# Patient Record
Sex: FEMALE | Race: WHITE | NOT HISPANIC OR LATINO | Employment: OTHER | URBAN - METROPOLITAN AREA
[De-identification: names, ages, dates, MRNs, and addresses within clinical notes are randomized per-mention and may not be internally consistent; named-entity substitution may affect disease eponyms.]

---

## 2023-01-01 ENCOUNTER — PATIENT OUTREACH (OUTPATIENT)
Dept: GASTROENTEROLOGY | Facility: CLINIC | Age: 88
End: 2023-01-01
Payer: COMMERCIAL

## 2023-01-01 ENCOUNTER — TRANSCRIBE ORDERS (OUTPATIENT)
Dept: OTHER | Age: 88
End: 2023-01-01

## 2023-01-01 ENCOUNTER — DOCUMENTATION ONLY (OUTPATIENT)
Dept: GASTROENTEROLOGY | Facility: CLINIC | Age: 88
End: 2023-01-01
Payer: COMMERCIAL

## 2023-01-01 ENCOUNTER — HEALTH MAINTENANCE LETTER (OUTPATIENT)
Age: 88
End: 2023-01-01

## 2023-01-01 ENCOUNTER — DOCUMENTATION ONLY (OUTPATIENT)
Dept: GASTROENTEROLOGY | Facility: CLINIC | Age: 88
End: 2023-01-01

## 2023-01-01 ENCOUNTER — TELEPHONE (OUTPATIENT)
Dept: GASTROENTEROLOGY | Facility: CLINIC | Age: 88
End: 2023-01-01
Payer: COMMERCIAL

## 2023-01-01 ENCOUNTER — OFFICE VISIT (OUTPATIENT)
Dept: GASTROENTEROLOGY | Facility: CLINIC | Age: 88
End: 2023-01-01
Payer: COMMERCIAL

## 2023-01-01 VITALS
BODY MASS INDEX: 22.52 KG/M2 | WEIGHT: 127.1 LBS | HEART RATE: 80 BPM | HEIGHT: 63 IN | DIASTOLIC BLOOD PRESSURE: 76 MMHG | SYSTOLIC BLOOD PRESSURE: 133 MMHG | OXYGEN SATURATION: 94 %

## 2023-01-01 DIAGNOSIS — Q45.3 PANCREAS DIVISUM: Primary | ICD-10-CM

## 2023-01-01 DIAGNOSIS — K85.00 IDIOPATHIC ACUTE PANCREATITIS WITHOUT INFECTION OR NECROSIS: Primary | ICD-10-CM

## 2023-01-01 PROCEDURE — 99205 OFFICE O/P NEW HI 60 MIN: CPT | Mod: GC | Performed by: INTERNAL MEDICINE

## 2023-01-01 RX ORDER — GLIPIZIDE 5 MG/1
TABLET ORAL
COMMUNITY
Start: 2023-01-01

## 2023-01-01 RX ORDER — ALBUTEROL SULFATE 0.83 MG/ML
2.5 SOLUTION RESPIRATORY (INHALATION)
COMMUNITY
Start: 2022-01-01

## 2023-01-01 RX ORDER — FLUTICASONE PROPIONATE AND SALMETEROL XINAFOATE 115; 21 UG/1; UG/1
AEROSOL, METERED RESPIRATORY (INHALATION)
COMMUNITY
Start: 2023-01-01

## 2023-01-01 RX ORDER — METOPROLOL SUCCINATE 25 MG/1
1 TABLET, EXTENDED RELEASE ORAL EVERY MORNING
COMMUNITY
Start: 2023-01-01

## 2023-01-01 RX ORDER — FUROSEMIDE 20 MG
1 TABLET ORAL EVERY MORNING
COMMUNITY
Start: 2023-01-01

## 2023-01-01 RX ORDER — SIMVASTATIN 10 MG
10 TABLET ORAL DAILY
COMMUNITY
Start: 2023-01-01

## 2023-01-01 RX ORDER — FLUTICASONE FUROATE AND VILANTEROL 200; 25 UG/1; UG/1
1 POWDER RESPIRATORY (INHALATION) DAILY
COMMUNITY
Start: 2022-01-01

## 2023-01-01 RX ORDER — AMLODIPINE BESYLATE 2.5 MG/1
2.5 TABLET ORAL
COMMUNITY
Start: 2023-01-01

## 2023-01-01 RX ORDER — CALCIUM CARBONATE/VITAMIN D3 600 MG-10
1 TABLET ORAL EVERY MORNING
COMMUNITY

## 2023-01-01 RX ORDER — PANTOPRAZOLE SODIUM 40 MG/1
1 TABLET, DELAYED RELEASE ORAL
COMMUNITY
Start: 2023-01-01 | End: 2023-08-19

## 2023-01-01 RX ORDER — NYSTATIN 100000 [USP'U]/G
POWDER TOPICAL EVERY 12 HOURS
COMMUNITY
Start: 2023-01-01 | End: 2024-03-28

## 2023-01-01 ASSESSMENT — PAIN SCALES - GENERAL: PAINLEVEL: NO PAIN (0)

## 2023-01-19 NOTE — TELEPHONE ENCOUNTER
Called patient to disucss referral from Dr Haney    Per patient, she does not want to follow up on referral at this time, pt doesn't have family who can help with transportation, [place] and doesn't want prolonged follow up at her age.    Welcomed pt to call us back at any time if she changes her mind.    ML

## 2023-01-19 NOTE — TELEPHONE ENCOUNTER
Pt referred by Dr Haney to Dr. Dao r/t pancreatic divisum, Pt declines further work up at [facility]    ML

## 2023-04-10 NOTE — TELEPHONE ENCOUNTER
Returned call, previously referred for SHARP study. Pt previously hesitant regarding transportation.     Discussed overall plan for SHARP, clinic, outpatient procedure, several visits, etc. Pt will come for in person visit with Dr. Dao to discuss on 4/26, study team notified.    ML

## 2023-04-10 NOTE — TELEPHONE ENCOUNTER
Health Call Center    Phone Message    May a detailed message be left on voicemail: yes     Reason for Call: Other: Pt's daughter, Lucy, is calling in asking for a call back. She states that the Pt had previously spoken with Gaby and did not want to follow up from a referral to Dr. Dao but was told to call back if she changed her mind. They would like to discuss care options now, as the Pt has now changed her mind. Please call back as soon as possible to discuss.     Action Taken: Message routed to:  [facility] Lyndsay    Travel Screening: Not Applicable

## 2023-04-11 NOTE — PROGRESS NOTES
Called [facility] to request images be pushed to [employer] PACS.    Patient will see Dr. Jermaine Dao in clinic on 4/26.    Images Requested:  MRI PANCREAS MRCP WITHOUT AND WITH CONTRAST (01/11/2023 1:50 PM CST)     CT ABD AND PELVIS WITHOUT IV CONTRAST (11/22/2022 3:59 PM CST)    CT ABD AND PELVIS WITHOUT IV CONTRAST (09/27/2021 3:43 PM CDT)    CT ABD AND PELVIS WITHOUT IV CONTRAST (05/14/2020 5:50 PM CDT)      Clinic Information:  [facility]    [address] [place] [address]    [phone]      [facility]    [address] [place] [address]    [phone]        SK

## 2023-04-20 NOTE — PROGRESS NOTES
Called PT and left VM.    Called to remind patient of their upcoming appointment with our GI clinic, on 04/26/23 at 11:00 AM with Dr. Jermaine Dao. This appointment is scheduled as an in-person appt. Please arrive 15 minutes early to check in for your appointment. , if your appointment is virtual (video or telephone) you need to be in [place] for the visit. To reschedule or cancel patient to call [phone].      SK

## 2023-04-21 NOTE — PROGRESS NOTES
Patient called and confirmed their upcoming appointment with our GI clinic, on 04/26/23 at 11:00 AM with Dr. Jermaine Dao. This appointment is scheduled as an in-person appt. Please arrive 15 minutes early to check in for your appointment. , if your appointment is virtual (video or telephone) you need to be in [place] for the visit. To reschedule or cancel patient to call [phone].        SK

## 2023-04-26 NOTE — LETTER
4/26/2023         RE: Jayla Egan  [address]  [place] [address]        Dear Colleague,    Thank you for referring your patient, Jayla Egan, to the [facility] [place]. Please see a copy of my visit note below.      Assessment & Plan   Jayla is an 87 year old female with a history of type 2 diabetes who presents for evaluation of acute pancreatitis, pancreas divisum, and chronic abdominal pain.   Referrred by Dr Zabrina Haney, Director of Advanced Endoscopy, [facility]    Acute interstitial pancreatitis   Chronic LUQ abdominal pain   Pancreas divisum   She appears to have had 1 episode of abdominal pain evaluated w CT without evident pancreatitis in 2021, but lipase not checked.. Then one documented episode of acute idiopathic interstitial pancreatitis for which she was admitted to the hospital from 11/22/2022 to 11/25/2022. Documented by interstitial pancreatitis on CT, but lipase 2x ULN.  for which she underwent EUS which showed partial vs complete pancreas divisum. She subsequently underwent MRCP on 1/11/23 which confirmed pancreas divisum which to our review terminates in bulbar configuration possibly suggesting Santorinicele. Since that time, she has had recurring abdominal pain of the same nature and in the same location almost daily (she says most days it occurs). It is worsened by eating. These findings are suggestive of pain from pancreatic etiology and would recommend trial of pancreatic enzymes to help alleviate symptoms. We discussed that this would not prevent future episodes of acute pancreatitis but would help to reduce her daily pain symptoms related to eating. We will start with nonenteric coated pancreatic enzyme with meals (she is currently taking PPI) if insurance covers this as this may help her pain more than enteric coated enzymes. If too expensive, we can try Creon instead. Would recommend she take this  indefinitely if it helps alleviate her pain but discontinue if it does not help. We also discussed possible enrollment in the SHARP trial for pancreas divisum. We note that she has only had 1 documented episode of acute pancreatitis, however, has had multiple pain flares of similar nature as her pancreatitis symptoms and thus think she could be a good candidate if she chooses to enroll. We will provide her with information about the trial and she will decided whether she would like to participate and let us know. Overall, it is unclear if pancreas divisum is the etiology for her pancreatitis, but we are highly suspicious for this. It is less likely this is related to Jardiance, as she has been consistently taking Jardiance since her first episode of pancreatitis 6 months ago and would expect multiple more recurrent episodes if the medication was the cause. Otherwise, we will continue to follow her and see how she does with pancreatic enzyme replacement.     Plan:  - Trial pancreatic enzyme replacement (nonenteric coated enzyme if possible based on price with insurance)  - Continue PPI   - Referral to pancreatic dietitian (pt prefers video visit)   - RTC in 3 months   - Provided information regarding [facility] trial, she will decide with her family whether she would like to enroll.       Yesica Chung MD  Internal Medicine PGY2    Return in about 3 months (around 7/26/2023) for Follow up, using a video visit, with me.       Patient discussed with Dr. Jermaine Dao MD  [facility] [place]     Seen and examined with GI fellow, agree with findings and recommendations.  Personally present for entire 60 minute visit.   Edited above excellent note  In sum, one episode documented AP, previous episode we could find w CT no lipase checked, although there might have been additional episode outside [facility]. Has intermittent similar pain especially w eating. May qualify for  SHARP randomized trial w exemption per PI. Explained as much as possible but need to send video and consent for pt and family to review. Either way, we committed to take care of her. Start on panc enzyme trial to see if helps with postprandial pain. See our pancreas dietitian. Follow-up in one month after they have had time to trial enzymes and consider SHARP trial. Would not likely offer minor papillotomy outside that trial, but will discuss w them.  Thanks to Dr Haney for this kind referral    Jermaine Dao MD  Gastroenterology, Pancreas and Biliary Disorders  [facility]       Chava Dickerson is an 87 year old female with a history of type 2 diabetes who presents for evaluation of abdominal pain and pancreas divisum.     She has been struggling with abdominal pain for the past few years and was recently admitted from 11/22/2022 to 11/25/2022 with the same pain and was diagnosed with acute pancreatitis with imaging showing acute interstitial pancreatitis and lipase of 158. The pain is located in her left-mid upper quadrant. She underwent EUS on 11/23/2022 which showed partial vs complete pancreas divisum, no CBD stones. She subsequently underwent MRCP for follow up on 1/11/23 which confirmed pancreas divisum. She is referred here for evaluation of this and for consideration for enrollment in SHARP trial.     She has had this same pain on most days for the past year or two. Pain is worsened with eating. She has been taking Jardiance for some time now, including when she had her first admission for acute pancreatitis, however, she has had no recurrent bouts since but struggles with pain nearly every day. Associated 20 lb weight loss. Her stools are soft and occur every two days or so. She takes tylenol for the pain which sometimes helps. She was also prescribed pantoprazole for gastritis which she has been taking, however, this has not reduced her pain.       New Patient      Review of  "Systems   Complete ROS negative unless otherwise stated above.        Objective    /76 (BP Location: Left arm, Patient Position: Sitting, Cuff Size: Adult Regular)   Pulse 80   Ht 1.6 m (5' 3\")   Wt 57.7 kg (127 lb 1.6 oz)   SpO2 94%   BMI 22.51 kg/m    Body mass index is 22.51 kg/m .  Physical Exam   GENERAL: healthy, alert and no distress  HEENT: pupils equal and round   RESP: no respiratory distress  ABDOMEN: soft, nontender, nondistended   MS: no gross musculoskeletal defects noted, no edema      Labs  Component      Lipase   LIPASE     Component 11/22/2022 03/08/2015        Lipase 158 High     --   LIPASE -- 45       Imaging:     MRCP 1/11/23:  \"TECHNIQUE:   Multi sequence, multiplanar MR imaging of the abdomen was obtained before and   after the intravenous administration of dotarem 20 mL contrast.  Secretin   protocol.  Heavily weighted T2 MRCP sequences obtained.     COMPARISON:   CT abdomen pelvis 11/22/2022.     FINDINGS:   There is cholelithiasis.  No MR evidence acute cholecystitis.  No abnormal   intra or extrahepatic biliary ductal dilatation.  The common bile duct measures   up to 6 mm in diameter.  The secretin images show no change in main pancreatic   duct diameter indicating no functional obstruction.  The dorsal pancreatic duct   does appear to consistent with a pancreatic divisum anatomy, terminating   expected location of the minor papilla.         Liver, spleen, adrenal glands, and kidneys are unremarkable.  No   hydronephrosis. Included small and large bowel loops are non-dilated.         No large signal abnormality in the visualized lungs. No suspicious bone marrow   or muscle signal abnormality.  Lumbar compression fractures and vertebral   augmentation changes.     IMPRESSION:   1. Main pancreatic duct appears to terminate at the minor papilla, consistent   with a pancreatic divisum anatomy.  No functional obstruction on secretin   imaging.   2. Cholelithiasis.\"    EUS " "11/23/2022:  \"Findings:        ENDOSCOPIC FINDING: :        The Z-line was irregular and was found 36 cm from the incisors.        A few dispersed less than 5 mm erosions with no bleeding and no stigmata        of recent bleeding were found in the entire examined stomach. Biopsies        were taken with a cold forceps for Helicobacter pylori testing.        The first portion of the duodenum, second portion of the duodenum, third        portion of the duodenum and fourth portion of the duodenum were normal.        Fluid pooling in the duodenum as can be seen in dysmotilty.        ENDOSONOGRAPHIC FINDING: :        The regions of the celiac plexus and celiac ganglia were visualized and        were normal. No lymphadenopathy was seen.        Endosonographic imaging in the peritoneal cavity showed no ascites        Endosonographic imaging in the left lobe of the liver showed no        intrahepatic ductal dilation or mass.        The gallbladder was visualized        Endosonographic imaging in the common bile duct showed no stones, sludge        or dilation.        There was no sign of significant endosonographic abnormality in the        ampulla.        There was no sign of significant endosonographic abnormality in the        pancreatic head (HOP), pancreatic body (BOP), pancreatic tail (TOP), the        uncinate process of the pancreas or the pancreatic neck (NOP). The        pancreatic duct measured up to 3 mm in diameter in the HOP, 2 mm in the        NOP, and 1 mm in the BOP with disappearance into the tail. The        pancreatic duct was regular in contour and was not hyperechoic. Sharp        demarcation was visualized between the dorsal and ventral pancreas,        which is a normal endosonographic feature.        Endosonographic imaging in the entire pancreas showed possible pancreas        divisum (partial vs complete) with dominant (more prominent) dorsal duct.        No obvious pancreas stranding. " "    Impression:            EGD                          - Erosive gastropathy                          - No gastric or duodenal ulcers                          EUS                          - Partial vs complete panreas divisum.                          - No CBD stones     Recommendation:        - Return patient to [facility] for [facility] care.                          - MRCP in 6 weeks (with secretin stimulation). If                          divisum is proved, suggest referral to the [facility] for consideration to be included in the                          SHARP study. i realize that we only have one possible                          (likely) documented attack of pancreatitis but she                          need to be evaluated for that study so that if future                          episodes occurred they are captured. The SHARP study                          is looking at the question whether recurrent acute                          pancreatitis in pancras divisum can be treated by                          minor papilla sphincterotomy. the EUS today is                          suggestive of pancreas divisum (partial vs complete).                          Whether this explains the patient chronic abdominal                          pain is unknown.                          - Avoid NSAIDs                          - Await path results. treat H pylori if found.                          - Start omeprazole 40 mg daily uninterrupted.                          - rest of care per primary team   Zabrina Haney MD \"              Chief Complaint   Patient presents with     New Patient       Vitals:    04/26/23 1109   BP: 133/76   BP Location: Left arm   Patient Position: Sitting   Cuff Size: Adult Regular   Pulse: 80   SpO2: 94%   Weight: 57.7 kg (127 lb 1.6 oz)   Height: 1.6 m (5' 3\")       Body mass index is 22.51 kg/m .    Rm Shanks        Again, thank you for allowing me to " participate in the care of your patient.        Sincerely,        eJrmaine Dao MD

## 2023-04-26 NOTE — PROGRESS NOTES
Assessment & Plan   Jayla is an 87 year old female with a history of type 2 diabetes who presents for evaluation of acute pancreatitis, pancreas divisum, and chronic abdominal pain.   Referrred by Dr Zabrina Haney, Director of Advanced Endoscopy, [facility]    Acute interstitial pancreatitis   Chronic LUQ abdominal pain   Pancreas divisum   She appears to have had 1 episode of abdominal pain evaluated w CT without evident pancreatitis in 2021, but lipase not checked.. Then one documented episode of acute idiopathic interstitial pancreatitis for which she was admitted to the hospital from 11/22/2022 to 11/25/2022. Documented by interstitial pancreatitis on CT, but lipase 2x ULN.  for which she underwent EUS which showed partial vs complete pancreas divisum. She subsequently underwent MRCP on 1/11/23 which confirmed pancreas divisum which to our review terminates in bulbar configuration possibly suggesting Santorinicele. Since that time, she has had recurring abdominal pain of the same nature and in the same location almost daily (she says most days it occurs). It is worsened by eating. These findings are suggestive of pain from pancreatic etiology and would recommend trial of pancreatic enzymes to help alleviate symptoms. We discussed that this would not prevent future episodes of acute pancreatitis but would help to reduce her daily pain symptoms related to eating. We will start with nonenteric coated pancreatic enzyme with meals (she is currently taking PPI) if insurance covers this as this may help her pain more than enteric coated enzymes. If too expensive, we can try Creon instead. Would recommend she take this indefinitely if it helps alleviate her pain but discontinue if it does not help. We also discussed possible enrollment in the SHARP trial for pancreas divisum. We note that she has only had 1 documented episode of acute pancreatitis, however, has had multiple pain flares of similar nature  as her pancreatitis symptoms and thus think she could be a good candidate if she chooses to enroll. We will provide her with information about the trial and she will decided whether she would like to participate and let us know. Overall, it is unclear if pancreas divisum is the etiology for her pancreatitis, but we are highly suspicious for this. It is less likely this is related to Jardiance, as she has been consistently taking Jardiance since her first episode of pancreatitis 6 months ago and would expect multiple more recurrent episodes if the medication was the cause. Otherwise, we will continue to follow her and see how she does with pancreatic enzyme replacement.     Plan:  - Trial pancreatic enzyme replacement (nonenteric coated enzyme if possible based on price with insurance)  - Continue PPI   - Referral to pancreatic dietitian (pt prefers video visit)   - RTC in 3 months   - Provided information regarding [facility] trial, she will decide with her family whether she would like to enroll.       Yesica Chung MD  Internal Medicine PGY2    Return in about 3 months (around 7/26/2023) for Follow up, using a video visit, with me.       Patient discussed with Dr. Jermaine Dao MD  [facility] [place]     Seen and examined with GI fellow, agree with findings and recommendations.  Personally present for entire 60 minute visit.   Edited above excellent note  In sum, one episode documented AP, previous episode we could find w CT no lipase checked, although there might have been additional episode outside [facility]. Has intermittent similar pain especially w eating. May qualify for SHARP randomized trial w exemption per PI. Explained as much as possible but need to send video and consent for pt and family to review. Either way, we committed to take care of her. Start on panc enzyme trial to see if helps with postprandial pain. See our pancreas dietitian. Follow-up in  "one month after they have had time to trial enzymes and consider SHARP trial. Would not likely offer minor papillotomy outside that trial, but will discuss w them.  Thanks to Dr Haney for this kind referral    Jermaine Dao MD  Gastroenterology, Pancreas and Biliary Disorders  [facility]       Chava Dickerson is an 87 year old female with a history of type 2 diabetes who presents for evaluation of abdominal pain and pancreas divisum.     She has been struggling with abdominal pain for the past few years and was recently admitted from 11/22/2022 to 11/25/2022 with the same pain and was diagnosed with acute pancreatitis with imaging showing acute interstitial pancreatitis and lipase of 158. The pain is located in her left-mid upper quadrant. She underwent EUS on 11/23/2022 which showed partial vs complete pancreas divisum, no CBD stones. She subsequently underwent MRCP for follow up on 1/11/23 which confirmed pancreas divisum. She is referred here for evaluation of this and for consideration for enrollment in SHARP trial.     She has had this same pain on most days for the past year or two. Pain is worsened with eating. She has been taking Jardiance for some time now, including when she had her first admission for acute pancreatitis, however, she has had no recurrent bouts since but struggles with pain nearly every day. Associated 20 lb weight loss. Her stools are soft and occur every two days or so. She takes tylenol for the pain which sometimes helps. She was also prescribed pantoprazole for gastritis which she has been taking, however, this has not reduced her pain.       New Patient      Review of Systems   Complete ROS negative unless otherwise stated above.         Objective    /76 (BP Location: Left arm, Patient Position: Sitting, Cuff Size: Adult Regular)   Pulse 80   Ht 1.6 m (5' 3\")   Wt 57.7 kg (127 lb 1.6 oz)   SpO2 94%   BMI 22.51 kg/m    Body mass index is 22.51 " "kg/m .  Physical Exam   GENERAL: healthy, alert and no distress  HEENT: pupils equal and round   RESP: no respiratory distress  ABDOMEN: soft, nontender, nondistended   MS: no gross musculoskeletal defects noted, no edema      Labs  Component      Lipase   LIPASE     Component 11/22/2022 03/08/2015        Lipase 158 High     --   LIPASE -- 45       Imaging:     MRCP 1/11/23:  \"TECHNIQUE:   Multi sequence, multiplanar MR imaging of the abdomen was obtained before and   after the intravenous administration of dotarem 20 mL contrast.  Secretin   protocol.  Heavily weighted T2 MRCP sequences obtained.     COMPARISON:   CT abdomen pelvis 11/22/2022.     FINDINGS:   There is cholelithiasis.  No MR evidence acute cholecystitis.  No abnormal   intra or extrahepatic biliary ductal dilatation.  The common bile duct measures   up to 6 mm in diameter.  The secretin images show no change in main pancreatic   duct diameter indicating no functional obstruction.  The dorsal pancreatic duct   does appear to consistent with a pancreatic divisum anatomy, terminating   expected location of the minor papilla.         Liver, spleen, adrenal glands, and kidneys are unremarkable.  No   hydronephrosis. Included small and large bowel loops are non-dilated.         No large signal abnormality in the visualized lungs. No suspicious bone marrow   or muscle signal abnormality.  Lumbar compression fractures and vertebral   augmentation changes.     IMPRESSION:   1. Main pancreatic duct appears to terminate at the minor papilla, consistent   with a pancreatic divisum anatomy.  No functional obstruction on secretin   imaging.   2. Cholelithiasis.\"    EUS 11/23/2022:  \"Findings:        ENDOSCOPIC FINDING: :        The Z-line was irregular and was found 36 cm from the incisors.        A few dispersed less than 5 mm erosions with no bleeding and no stigmata        of recent bleeding were found in the entire examined stomach. Biopsies        were taken " with a cold forceps for Helicobacter pylori testing.        The first portion of the duodenum, second portion of the duodenum, third        portion of the duodenum and fourth portion of the duodenum were normal.        Fluid pooling in the duodenum as can be seen in dysmotilty.        ENDOSONOGRAPHIC FINDING: :        The regions of the celiac plexus and celiac ganglia were visualized and        were normal. No lymphadenopathy was seen.        Endosonographic imaging in the peritoneal cavity showed no ascites        Endosonographic imaging in the left lobe of the liver showed no        intrahepatic ductal dilation or mass.        The gallbladder was visualized        Endosonographic imaging in the common bile duct showed no stones, sludge        or dilation.        There was no sign of significant endosonographic abnormality in the        ampulla.        There was no sign of significant endosonographic abnormality in the        pancreatic head (HOP), pancreatic body (BOP), pancreatic tail (TOP), the        uncinate process of the pancreas or the pancreatic neck (NOP). The        pancreatic duct measured up to 3 mm in diameter in the HOP, 2 mm in the        NOP, and 1 mm in the BOP with disappearance into the tail. The        pancreatic duct was regular in contour and was not hyperechoic. Sharp        demarcation was visualized between the dorsal and ventral pancreas,        which is a normal endosonographic feature.        Endosonographic imaging in the entire pancreas showed possible pancreas        divisum (partial vs complete) with dominant (more prominent) dorsal duct.        No obvious pancreas stranding.     Impression:            EGD                          - Erosive gastropathy                          - No gastric or duodenal ulcers                          EUS                          - Partial vs complete panreas divisum.                          - No CBD stones     Recommendation:        - Return  "patient to [facility] for [facility] care.                          - MRCP in 6 weeks (with secretin stimulation). If                          divisum is proved, suggest referral to the [facility] for consideration to be included in the                          SHARP study. i realize that we only have one possible                          (likely) documented attack of pancreatitis but she                          need to be evaluated for that study so that if future                          episodes occurred they are captured. The SHARP study                          is looking at the question whether recurrent acute                          pancreatitis in pancras divisum can be treated by                          minor papilla sphincterotomy. the EUS today is                          suggestive of pancreas divisum (partial vs complete).                          Whether this explains the patient chronic abdominal                          pain is unknown.                          - Avoid NSAIDs                          - Await path results. treat H pylori if found.                          - Start omeprazole 40 mg daily uninterrupted.                          - rest of care per primary team   Zabrina Haney MD \"            "

## 2023-04-26 NOTE — PATIENT INSTRUCTIONS
Follow up:    Dr. Dao has outlined the following steps after your recent clinic visit:    Low dose Viokace, will send to your pharmacy.  If too expensive, call us and we can try alternate medications    2. Visit with dietitian, scheduling will reach out    3. I will send SHARP video    4. Return to clinic with Dr Dao in 3 months, scheduling will reach out     Creon was sent to your Pharmacy, with refills.  Please call your Pharmacy to ensure script has been received.  Please let us know if the cost is very high and review options at the bottom of this message.       You ve been prescribed pancreatic enzyme therapy to help aid in your digestion because of pancreatic insufficiency and/or the symptoms you are exhibiting.  With normal digestion, pancreatic enzymes are released once nourishment is introduced by mouth, to aid in the breakdown of protein, carbohydrates and other elements.  When you have some insufficiency in this process, you can experience abdominal pain, bloating, nausea, vomiting, diarrhea +/- oily stools and/or abdominal cramping.     It s important to continue with a low fat diet, taking in small amounts of nourishment at a time, having a solid baseline of hydration at all times.  When pancreatic enzymes are recommended for you, it s best to take one enzyme pill at the start of getting nourishment; whether it s your first bite of solid food or initial drink of nutritional liquid supplements.  Keep in mind some liquid alternatives, like [place] Instant Breakfast, Ensure and Boost, as well as smoothies and protein shakes.  It isn t necessary to take enzymes with clear liquids, but important to have water and clear liquids with you at all times for hydration.  Take another enzyme during that nourishment and at the end, up to prescription recommendations.     You may notice worsening or initiation of symptoms when introducing pancreatic enzymes to your digestive process, but should improve or  subside within a week or so.  If you are experiencing significant symptoms, stop taking the enzymes and call your RN Care Coordinator.  If you are unsure if you are getting the response you should be and/or have any questions or concerns, please contact your RN Care Coordinator.      Important Safety Facts  The most common side effects include: increased (hyperglycemia) or decreased (hypoglycemia) blood sugars, pain in your stomach area (abdominal area), frequent or abnormal bowel movements, gas, vomiting, dizziness or sore throat and cough.  CREON may increase blood uric acid levels. This may cause worsening of swollen, painful joints (gout).  CREON and other pancreatic enzyme products are made from the pancreas of pigs, the same pigs people eat as pork. These pigs may carry viruses. Although it has never been reported, it may be possible for a person to get a viral infection from taking pancreatic enzyme products that come from pigs.  CREON may increase your chance of having a rare bowel disorder called fibrosing colonopathy. This condition is serious and may require surgery. The risk of having this condition may be reduced by following the dosing instructions that your doctor gave you.  Before Starting  Tell your doctor if you:  are allergic to pork (pig) products  have gout, kidney disease, or high blood uric acid (hyperuricemia)  have trouble swallowing capsules  are pregnant or plan to become pregnant. It is not known if CREON will harm your unborn baby.  are breast-feeding or plan to breast-feed. It is not known if CREON passes into your breast milk.  CREON is a prescription medicine used to treat people who cannot digest food normally because their pancreas does not make enough enzymes.  Always take CREON with a meal or snack and enough liquid to swallow CREON completely.  Do not crush or chew CREON capsules or its contents, and do not hold the capsule or capsule contents in your mouth. This may cause  irritation in your mouth or change the way CREON works in your body.      COPAY ASSISTANCE PROGRAMS  Enroll in Creon On Course - this program helps to reduce your copay (up to $3,000 per year) and provide access to free vitamins, that may also be needed related to your pancreatic disease.  Visit: [employer]  Call: [phone]    2. For Medicare Part D patients:  call to inquire about additional programs that can reduce your monthly copays  Visit: https://www.medicare.gov/drug-coverage-part-d  Call: 1-682.751.4070    3. For patients with financial need not helped by the 2 options above:  Visit: https://www.[employer]/patients/patient-support/patient-assistance.html  Call: 1-561.730.7396    Please call with any questions or concerns regarding your clinic visit today.    It is a pleasure being involved in your health care.    Contacts post-consultation depending on your need:    Schedule Clinic Appointments            [phone] # 1   M-F 7:30 - 5 pm    Gaby Mcgrath, RN Care Coordinator (Dr. Humphrey/Dr. Dao)  [phone]    Xiomara Lujan, RN Care Coordinator (Dr. Ewing)   [phone]    Merry Iglesias, RN Care Coordinator (Dr. Levin/Dr. Garland)  [phone]      For urgent/emergent questions after business hours, you may reach the on-call GI Fellow by contacting the [facility]  at [phone].    How do I schedule labs, imaging studies, or procedures that were ordered in clinic today?     Labs: To schedule lab appointment at the [facility], use my chart or call [phone]. If you have a [place] lab closer to home where you are regularly seen you can give them a call.     Procedures: If a colonoscopy, upper endoscopy, breath test, esophageal manometry, or pH impedence was ordered today, our endoscopy team will call you to schedule this. If you have not heard from our endoscopy team within a week, please call [phone] to schedule.     Imaging  Studies: If you were scheduled for a CT scan, X-ray, MRI, ultrasound, HIDA scan or other imaging study, please call [phone] to have this scheduled.     Referral: If a referral to another specialty was ordered, expect a phone call or follow instructions above. If you have not heard from anyone regarding your referral in a week, please call our clinic to check the status.     How to I schedule a follow-up visit?  If you did not schedule a follow-up visit today, please call [phone] to schedule a follow-up office visit.

## 2023-04-26 NOTE — Clinical Note
4/26/2023         RE: Jayla Egan  [address]  [place] [address]        & Plan   Jayla is an 87 year old female with a history of type 2 diabetes who presents for evaluation of acute pancreatitis, pancreas divisum, and chronic abdominal pain.   Referrred by Dr Zabrina Haney, Director of Advanced Endoscopy, [facility]    Acute interstitial pancreatitis   Chronic LUQ abdominal pain   Pancreas divisum   She appears to have had 1 episode of abdominal pain evaluated w CT without evident pancreatitis in 2021, but lipase not checked.. Then one documented episode of acute idiopathic interstitial pancreatitis for which she was admitted to the hospital from 11/22/2022 to 11/25/2022. Documented by interstitial pancreatitis on CT, but lipase 2x ULN.  for which she underwent EUS which showed partial vs complete pancreas divisum. She subsequently underwent MRCP on 1/11/23 which confirmed pancreas divisum which to our review terminates in bulbar configuration possibly suggesting Santorinicele. Since that time, she has had recurring abdominal pain of the same nature and in the same location almost daily (she says most days it occurs). It is worsened by eating. These findings are suggestive of pain from pancreatic etiology and would recommend trial of pancreatic enzymes to help alleviate symptoms. We discussed that this would not prevent future episodes of acute pancreatitis but would help to reduce her daily pain symptoms related to eating. We will start with nonenteric coated pancreatic enzyme with meals (she is currently taking PPI) if insurance covers this as this may help her pain more than enteric coated enzymes. If too expensive, we can try Creon instead. Would recommend she take this indefinitely if it helps alleviate her pain but discontinue if it does not help. We also discussed possible enrollment in the SHARP trial for pancreas divisum. We note that she has only had 1 documented episode  of acute pancreatitis, however, has had multiple pain flares of similar nature as her pancreatitis symptoms and thus think she could be a good candidate if she chooses to enroll. We will provide her with information about the trial and she will decided whether she would like to participate and let us know. Overall, it is unclear if pancreas divisum is the etiology for her pancreatitis, but we are highly suspicious for this. It is less likely this is related to Jardiance, as she has been consistently taking Jardiance since her first episode of pancreatitis 6 months ago and would expect multiple more recurrent episodes if the medication was the cause. Otherwise, we will continue to follow her and see how she does with pancreatic enzyme replacement.     Plan:  - Trial pancreatic enzyme replacement (nonenteric coated enzyme if possible based on price with insurance)  - Continue PPI   - Referral to pancreatic dietitian (pt prefers video visit)   - RTC in 3 months   - Provided information regarding [facility] trial, she will decide with her family whether she would like to enroll.       Yesica Chung MD  Internal Medicine PGY2    Return in about 3 months (around 7/26/2023) for Follow up, using a video visit, with me.       Patient discussed with Dr. Jermaine Dao MD  [facility] [place]     Seen and examined with GI fellow, agree with findings and recommendations.  Personally present for entire 60 minute visit.   Edited above excellent note  In sum, one episode documented AP, previous episode we could find w CT no lipase checked, although there might have been additional episode outside [facility]. Has intermittent similar pain especially w eating. May qualify for SHARP randomized trial w exemption per PI. Explained as much as possible but need to send video and consent for pt and family to review. Either way, we committed to take care of her. Start on panc enzyme trial to  "see if helps with postprandial pain. See our pancreas dietitian. Follow-up in one month after they have had time to trial enzymes and consider SHARP trial. Would not likely offer minor papillotomy outside that trial, but will discuss w them.  Thanks to Dr Haney for this kind referral    Jermaine Dao MD  Gastroenterology, Pancreas and Biliary Disorders  [facility]          Jayla is an 87 year old female with a history of type 2 diabetes who presents for evaluation of abdominal pain and pancreas divisum.     She has been struggling with abdominal pain for the past few years and was recently admitted from 11/22/2022 to 11/25/2022 with the same pain and was diagnosed with acute pancreatitis with imaging showing acute interstitial pancreatitis and lipase of 158. The pain is located in her left-mid upper quadrant. She underwent EUS on 11/23/2022 which showed partial vs complete pancreas divisum, no CBD stones. She subsequently underwent MRCP for follow up on 1/11/23 which confirmed pancreas divisum. She is referred here for evaluation of this and for consideration for enrollment in SHARP trial.     She has had this same pain on most days for the past year or two. Pain is worsened with eating. She has been taking Jardiance for some time now, including when she had her first admission for acute pancreatitis, however, she has had no recurrent bouts since but struggles with pain nearly every day. Associated 20 lb weight loss. Her stools are soft and occur every two days or so. She takes tylenol for the pain which sometimes helps. She was also prescribed pantoprazole for gastritis which she has been taking, however, this has not reduced her pain.       New Patient      Review of Systems   Complete ROS negative unless otherwise stated above.            /76 (BP Location: Left arm, Patient Position: Sitting, Cuff Size: Adult Regular)   Pulse 80   Ht 1.6 m (5' 3\")   Wt 57.7 kg (127 lb 1.6 oz)   " "SpO2 94%   BMI 22.51 kg/m    Body mass index is 22.51 kg/m .  Physical Exam   GENERAL: healthy, alert and no distress  HEENT: pupils equal and round   RESP: no respiratory distress  ABDOMEN: soft, nontender, nondistended   MS: no gross musculoskeletal defects noted, no edema      Labs  Component      Lipase   LIPASE     Component 11/22/2022 03/08/2015        Lipase 158 High     --   LIPASE -- 45       Imaging:     MRCP 1/11/23:  \"TECHNIQUE:   Multi sequence, multiplanar MR imaging of the abdomen was obtained before and   after the intravenous administration of dotarem 20 mL contrast.  Secretin   protocol.  Heavily weighted T2 MRCP sequences obtained.     COMPARISON:   CT abdomen pelvis 11/22/2022.     FINDINGS:   There is cholelithiasis.  No MR evidence acute cholecystitis.  No abnormal   intra or extrahepatic biliary ductal dilatation.  The common bile duct measures   up to 6 mm in diameter.  The secretin images show no change in main pancreatic   duct diameter indicating no functional obstruction.  The dorsal pancreatic duct   does appear to consistent with a pancreatic divisum anatomy, terminating   expected location of the minor papilla.         Liver, spleen, adrenal glands, and kidneys are unremarkable.  No   hydronephrosis. Included small and large bowel loops are non-dilated.         No large signal abnormality in the visualized lungs. No suspicious bone marrow   or muscle signal abnormality.  Lumbar compression fractures and vertebral   augmentation changes.     IMPRESSION:   1. Main pancreatic duct appears to terminate at the minor papilla, consistent   with a pancreatic divisum anatomy.  No functional obstruction on secretin   imaging.   2. Cholelithiasis.\"    EUS 11/23/2022:  \"Findings:        ENDOSCOPIC FINDING: :        The Z-line was irregular and was found 36 cm from the incisors.        A few dispersed less than 5 mm erosions with no bleeding and no stigmata        of recent bleeding were found in " the entire examined stomach. Biopsies        were taken with a cold forceps for Helicobacter pylori testing.        The first portion of the duodenum, second portion of the duodenum, third        portion of the duodenum and fourth portion of the duodenum were normal.        Fluid pooling in the duodenum as can be seen in dysmotilty.        ENDOSONOGRAPHIC FINDING: :        The regions of the celiac plexus and celiac ganglia were visualized and        were normal. No lymphadenopathy was seen.        Endosonographic imaging in the peritoneal cavity showed no ascites        Endosonographic imaging in the left lobe of the liver showed no        intrahepatic ductal dilation or mass.        The gallbladder was visualized        Endosonographic imaging in the common bile duct showed no stones, sludge        or dilation.        There was no sign of significant endosonographic abnormality in the        ampulla.        There was no sign of significant endosonographic abnormality in the        pancreatic head (HOP), pancreatic body (BOP), pancreatic tail (TOP), the        uncinate process of the pancreas or the pancreatic neck (NOP). The        pancreatic duct measured up to 3 mm in diameter in the HOP, 2 mm in the        NOP, and 1 mm in the BOP with disappearance into the tail. The        pancreatic duct was regular in contour and was not hyperechoic. Sharp        demarcation was visualized between the dorsal and ventral pancreas,        which is a normal endosonographic feature.        Endosonographic imaging in the entire pancreas showed possible pancreas        divisum (partial vs complete) with dominant (more prominent) dorsal duct.        No obvious pancreas stranding.     Impression:            EGD                          - Erosive gastropathy                          - No gastric or duodenal ulcers                          EUS                          - Partial vs complete panreas divisum.                          -  "No CBD stones     Recommendation:        - [facility] for [facility] care.                          - MRCP in 6 weeks (with secretin stimulation). If                          divisum is proved, suggest referral to the [facility] for consideration to be included in the                          SHARP study. i realize that we only have one possible                          (likely) documented attack of pancreatitis but she                          need to be evaluated for that study so that if future                          episodes occurred they are captured. The SHARP study                          is looking at the question whether recurrent acute                          pancreatitis in pancras divisum can be treated by                          minor papilla sphincterotomy. the EUS today is                          suggestive of pancreas divisum (partial vs complete).                          Whether this explains the patient chronic abdominal                          pain is unknown.                          - Avoid NSAIDs                          - Await path results. treat H pylori if found.                          - Start omeprazole 40 mg daily uninterrupted.                          - rest of care per primary team   Zabrina Haney MD \"              Chief Complaint   Patient presents with     New Patient       Vitals:    04/26/23 1109   BP: 133/76   BP Location: Left arm   Patient Position: Sitting   Cuff Size: Adult Regular   Pulse: 80   SpO2: 94%   Weight: 57.7 kg (127 lb 1.6 oz)   Height: 1.6 m (5' 3\")       Body mass index is 22.51 kg/m .    Rm Dao MD"

## 2023-04-26 NOTE — LETTER
4/26/2023         RE: Jayla Egan  [address]  [place] [address]        Dear Colleague,    Thank you for referring your patient, Jayla Egan, to the [facility] [place]. Please see a copy of my visit note below.      Assessment & Plan   Jayla is an 87 year old female with a history of type 2 diabetes who presents for evaluation of acute pancreatitis, pancreas divisum, and chronic abdominal pain.   Referrred by Dr Zabrina Haney, Director of Advanced Endoscopy, [facility]    Acute interstitial pancreatitis   Chronic LUQ abdominal pain   Pancreas divisum   She appears to have had 1 episode of abdominal pain evaluated w CT without evident pancreatitis in 2021, but lipase not checked.. Then one documented episode of acute idiopathic interstitial pancreatitis for which she was admitted to the hospital from 11/22/2022 to 11/25/2022. Documented by interstitial pancreatitis on CT, but lipase 2x ULN.  for which she underwent EUS which showed partial vs complete pancreas divisum. She subsequently underwent MRCP on 1/11/23 which confirmed pancreas divisum which to our review terminates in bulbar configuration possibly suggesting Santorinicele. Since that time, she has had recurring abdominal pain of the same nature and in the same location almost daily (she says most days it occurs). It is worsened by eating. These findings are suggestive of pain from pancreatic etiology and would recommend trial of pancreatic enzymes to help alleviate symptoms. We discussed that this would not prevent future episodes of acute pancreatitis but would help to reduce her daily pain symptoms related to eating. We will start with nonenteric coated pancreatic enzyme with meals (she is currently taking PPI) if insurance covers this as this may help her pain more than enteric coated enzymes. If too expensive, we can try Creon instead. Would recommend she take this  indefinitely if it helps alleviate her pain but discontinue if it does not help. We also discussed possible enrollment in the SHARP trial for pancreas divisum. We note that she has only had 1 documented episode of acute pancreatitis, however, has had multiple pain flares of similar nature as her pancreatitis symptoms and thus think she could be a good candidate if she chooses to enroll. We will provide her with information about the trial and she will decided whether she would like to participate and let us know. Overall, it is unclear if pancreas divisum is the etiology for her pancreatitis, but we are highly suspicious for this. It is less likely this is related to Jardiance, as she has been consistently taking Jardiance since her first episode of pancreatitis 6 months ago and would expect multiple more recurrent episodes if the medication was the cause. Otherwise, we will continue to follow her and see how she does with pancreatic enzyme replacement.     Plan:  - Trial pancreatic enzyme replacement (nonenteric coated enzyme if possible based on price with insurance)  - Continue PPI   - Referral to pancreatic dietitian (pt prefers video visit)   - RTC in 3 months   - Provided information regarding [facility] trial, she will decide with her family whether she would like to enroll.       Yesica Chung MD  Internal Medicine PGY2    Return in about 3 months (around 7/26/2023) for Follow up, using a video visit, with me.       Patient discussed with Dr. Jermaine Dao MD  [facility] [place]     Seen and examined with GI fellow, agree with findings and recommendations.  Personally present for entire 60 minute visit.   Edited above excellent note  In sum, one episode documented AP, previous episode we could find w CT no lipase checked, although there might have been additional episode outside [facility]. Has intermittent similar pain especially w eating. May qualify for  SHARP randomized trial w exemption per PI. Explained as much as possible but need to send video and consent for pt and family to review. Either way, we committed to take care of her. Start on panc enzyme trial to see if helps with postprandial pain. See our pancreas dietitian. Follow-up in one month after they have had time to trial enzymes and consider SHARP trial. Would not likely offer minor papillotomy outside that trial, but will discuss w them.  Thanks to Dr Haney for this kind referral    Jermaine Dao MD  Gastroenterology, Pancreas and Biliary Disorders  [facility]       Chava Dickerson is an 87 year old female with a history of type 2 diabetes who presents for evaluation of abdominal pain and pancreas divisum.     She has been struggling with abdominal pain for the past few years and was recently admitted from 11/22/2022 to 11/25/2022 with the same pain and was diagnosed with acute pancreatitis with imaging showing acute interstitial pancreatitis and lipase of 158. The pain is located in her left-mid upper quadrant. She underwent EUS on 11/23/2022 which showed partial vs complete pancreas divisum, no CBD stones. She subsequently underwent MRCP for follow up on 1/11/23 which confirmed pancreas divisum. She is referred here for evaluation of this and for consideration for enrollment in SHARP trial.     She has had this same pain on most days for the past year or two. Pain is worsened with eating. She has been taking Jardiance for some time now, including when she had her first admission for acute pancreatitis, however, she has had no recurrent bouts since but struggles with pain nearly every day. Associated 20 lb weight loss. Her stools are soft and occur every two days or so. She takes tylenol for the pain which sometimes helps. She was also prescribed pantoprazole for gastritis which she has been taking, however, this has not reduced her pain.       New Patient      Review of  "Systems   Complete ROS negative unless otherwise stated above.        Objective    /76 (BP Location: Left arm, Patient Position: Sitting, Cuff Size: Adult Regular)   Pulse 80   Ht 1.6 m (5' 3\")   Wt 57.7 kg (127 lb 1.6 oz)   SpO2 94%   BMI 22.51 kg/m    Body mass index is 22.51 kg/m .  Physical Exam   GENERAL: healthy, alert and no distress  HEENT: pupils equal and round   RESP: no respiratory distress  ABDOMEN: soft, nontender, nondistended   MS: no gross musculoskeletal defects noted, no edema      Labs  Component      Lipase   LIPASE     Component 11/22/2022 03/08/2015        Lipase 158 High     --   LIPASE -- 45       Imaging:     MRCP 1/11/23:  \"TECHNIQUE:   Multi sequence, multiplanar MR imaging of the abdomen was obtained before and   after the intravenous administration of dotarem 20 mL contrast.  Secretin   protocol.  Heavily weighted T2 MRCP sequences obtained.     COMPARISON:   CT abdomen pelvis 11/22/2022.     FINDINGS:   There is cholelithiasis.  No MR evidence acute cholecystitis.  No abnormal   intra or extrahepatic biliary ductal dilatation.  The common bile duct measures   up to 6 mm in diameter.  The secretin images show no change in main pancreatic   duct diameter indicating no functional obstruction.  The dorsal pancreatic duct   does appear to consistent with a pancreatic divisum anatomy, terminating   expected location of the minor papilla.         Liver, spleen, adrenal glands, and kidneys are unremarkable.  No   hydronephrosis. Included small and large bowel loops are non-dilated.         No large signal abnormality in the visualized lungs. No suspicious bone marrow   or muscle signal abnormality.  Lumbar compression fractures and vertebral   augmentation changes.     IMPRESSION:   1. Main pancreatic duct appears to terminate at the minor papilla, consistent   with a pancreatic divisum anatomy.  No functional obstruction on secretin   imaging.   2. Cholelithiasis.\"    EUS " "11/23/2022:  \"Findings:        ENDOSCOPIC FINDING: :        The Z-line was irregular and was found 36 cm from the incisors.        A few dispersed less than 5 mm erosions with no bleeding and no stigmata        of recent bleeding were found in the entire examined stomach. Biopsies        were taken with a cold forceps for Helicobacter pylori testing.        The first portion of the duodenum, second portion of the duodenum, third        portion of the duodenum and fourth portion of the duodenum were normal.        Fluid pooling in the duodenum as can be seen in dysmotilty.        ENDOSONOGRAPHIC FINDING: :        The regions of the celiac plexus and celiac ganglia were visualized and        were normal. No lymphadenopathy was seen.        Endosonographic imaging in the peritoneal cavity showed no ascites        Endosonographic imaging in the left lobe of the liver showed no        intrahepatic ductal dilation or mass.        The gallbladder was visualized        Endosonographic imaging in the common bile duct showed no stones, sludge        or dilation.        There was no sign of significant endosonographic abnormality in the        ampulla.        There was no sign of significant endosonographic abnormality in the        pancreatic head (HOP), pancreatic body (BOP), pancreatic tail (TOP), the        uncinate process of the pancreas or the pancreatic neck (NOP). The        pancreatic duct measured up to 3 mm in diameter in the HOP, 2 mm in the        NOP, and 1 mm in the BOP with disappearance into the tail. The        pancreatic duct was regular in contour and was not hyperechoic. Sharp        demarcation was visualized between the dorsal and ventral pancreas,        which is a normal endosonographic feature.        Endosonographic imaging in the entire pancreas showed possible pancreas        divisum (partial vs complete) with dominant (more prominent) dorsal duct.        No obvious pancreas stranding. " "    Impression:            EGD                          - Erosive gastropathy                          - No gastric or duodenal ulcers                          EUS                          - Partial vs complete panreas divisum.                          - No CBD stones     Recommendation:        - Return patient to [facility] for ongoing care.                          - MRCP in 6 weeks (with secretin stimulation). If                          divisum is proved, suggest referral to the [facility] for consideration to be included in the                          SHARP study. i realize that we only have one possible                          (likely) documented attack of pancreatitis but she                          need to be evaluated for that study so that if future                          episodes occurred they are captured. The SHARP study                          is looking at the question whether recurrent acute                          pancreatitis in pancras divisum can be treated by                          minor papilla sphincterotomy. the EUS today is                          suggestive of pancreas divisum (partial vs complete).                          Whether this explains the patient chronic abdominal                          pain is unknown.                          - Avoid NSAIDs                          - Await path results. treat H pylori if found.                          - Start omeprazole 40 mg daily uninterrupted.                          - rest of care per primary team   Zabrina Haney MD \"              Chief Complaint   Patient presents with     New Patient       Vitals:    04/26/23 1109   BP: 133/76   BP Location: Left arm   Patient Position: Sitting   Cuff Size: Adult Regular   Pulse: 80   SpO2: 94%   Weight: 57.7 kg (127 lb 1.6 oz)   Height: 1.6 m (5' 3\")       Body mass index is 22.51 kg/m .    Rm Shanks      Again, thank you for allowing me to participate " in the care of your patient.        Sincerely,        Jermaine Dao MD

## 2023-04-26 NOTE — LETTER
4/26/2023         RE: Jayla Egan  [address]  [facility] [place] [facility] [place] [address]        Dear Colleague,    Thank you for referring your patient, Jayla Egan, to the [facility] [place]. Please see a copy of my visit note below.      Assessment & Plan   Jayla is an 87 year old female with a history of type 2 diabetes who presents for evaluation of acute pancreatitis, pancreas divisum, and chronic abdominal pain.   Referrred by Dr Zabrina Haney, Director of Advanced Endoscopy, [facility]    Acute interstitial pancreatitis   Chronic LUQ abdominal pain   Pancreas divisum   She appears to have had 1 episode of abdominal pain evaluated w CT without evident pancreatitis in 2021, but lipase not checked.. Then one documented episode of acute idiopathic interstitial pancreatitis for which she was admitted to the hospital from 11/22/2022 to 11/25/2022. Documented by interstitial pancreatitis on CT, but lipase 2x ULN.  for which she underwent EUS which showed partial vs complete pancreas divisum. She subsequently underwent MRCP on 1/11/23 which confirmed pancreas divisum which to our review terminates in bulbar configuration possibly suggesting Santorinicele. Since that time, she has had recurring abdominal pain of the same nature and in the same location almost daily (she says most days it occurs). It is worsened by eating. These findings are suggestive of pain from pancreatic etiology and would recommend trial of pancreatic enzymes to help alleviate symptoms. We discussed that this would not prevent future episodes of acute pancreatitis but would help to reduce her daily pain symptoms related to eating. We will start with nonenteric coated pancreatic enzyme with meals (she is currently taking PPI) if insurance covers this as this may help her pain more than enteric coated enzymes. If too expensive, we can try Creon instead. Would recommend she take this  indefinitely if it helps alleviate her pain but discontinue if it does not help. We also discussed possible enrollment in the SHARP trial for pancreas divisum. We note that she has only had 1 documented episode of acute pancreatitis, however, has had multiple pain flares of similar nature as her pancreatitis symptoms and thus think she could be a good candidate if she chooses to enroll. We will provide her with information about the trial and she will decided whether she would like to participate and let us know. Overall, it is unclear if pancreas divisum is the etiology for her pancreatitis, but we are highly suspicious for this. It is less likely this is related to Jardiance, as she has been consistently taking Jardiance since her first episode of pancreatitis 6 months ago and would expect multiple more recurrent episodes if the medication was the cause. Otherwise, we will continue to follow her and see how she does with pancreatic enzyme replacement.     Plan:  - Trial pancreatic enzyme replacement (nonenteric coated enzyme if possible based on price with insurance)  - Continue PPI   - Referral to pancreatic dietitian (pt prefers video visit)   - RTC in 3 months   - Provided information regarding [facility] trial, she will decide with her family whether she would like to enroll.       Yesica Chung MD  Internal Medicine PGY2    Return in about 3 months (around 7/26/2023) for Follow up, using a video visit, with me.       Patient discussed with Dr. Jermaine Dao MD  [facility] [place]     Seen and examined with GI fellow, agree with findings and recommendations.  Personally present for entire 60 minute visit.   Edited above excellent note  In sum, one episode documented AP, previous episode we could find w CT no lipase checked, although there might have been additional episode outside [facility]. Has intermittent similar pain especially w eating. May qualify for  SHARP randomized trial w exemption per PI. Explained as much as possible but need to send video and consent for pt and family to review. Either way, we committed to take care of her. Start on panc enzyme trial to see if helps with postprandial pain. See our pancreas dietitian. Follow-up in one month after they have had time to trial enzymes and consider SHARP trial. Would not likely offer minor papillotomy outside that trial, but will discuss w them.  Thanks to Dr Haney for this kind referral    Jermaine Dao MD  Gastroenterology, Pancreas and Biliary Disorders  [facility]       Chava Dickerson is an 87 year old female with a history of type 2 diabetes who presents for evaluation of abdominal pain and pancreas divisum.     She has been struggling with abdominal pain for the past few years and was recently admitted from 11/22/2022 to 11/25/2022 with the same pain and was diagnosed with acute pancreatitis with imaging showing acute interstitial pancreatitis and lipase of 158. The pain is located in her left-mid upper quadrant. She underwent EUS on 11/23/2022 which showed partial vs complete pancreas divisum, no CBD stones. She subsequently underwent MRCP for follow up on 1/11/23 which confirmed pancreas divisum. She is referred here for evaluation of this and for consideration for enrollment in SHARP trial.     She has had this same pain on most days for the past year or two. Pain is worsened with eating. She has been taking Jardiance for some time now, including when she had her first admission for acute pancreatitis, however, she has had no recurrent bouts since but struggles with pain nearly every day. Associated 20 lb weight loss. Her stools are soft and occur every two days or so. She takes tylenol for the pain which sometimes helps. She was also prescribed pantoprazole for gastritis which she has been taking, however, this has not reduced her pain.       New Patient      Review of  "Systems   Complete ROS negative unless otherwise stated above.        Objective    /76 (BP Location: Left arm, Patient Position: Sitting, Cuff Size: Adult Regular)   Pulse 80   Ht 1.6 m (5' 3\")   Wt 57.7 kg (127 lb 1.6 oz)   SpO2 94%   BMI 22.51 kg/m    Body mass index is 22.51 kg/m .  Physical Exam   GENERAL: healthy, alert and no distress  HEENT: pupils equal and round   RESP: no respiratory distress  ABDOMEN: soft, nontender, nondistended   MS: no gross musculoskeletal defects noted, no edema      Labs  Component      Lipase   LIPASE     Component 11/22/2022 03/08/2015        Lipase 158 High     --   LIPASE -- 45       Imaging:     MRCP 1/11/23:  \"TECHNIQUE:   Multi sequence, multiplanar MR imaging of the abdomen was obtained before and   after the intravenous administration of dotarem 20 mL contrast.  Secretin   protocol.  Heavily weighted T2 MRCP sequences obtained.     COMPARISON:   CT abdomen pelvis 11/22/2022.     FINDINGS:   There is cholelithiasis.  No MR evidence acute cholecystitis.  No abnormal   intra or extrahepatic biliary ductal dilatation.  The common bile duct measures   up to 6 mm in diameter.  The secretin images show no change in main pancreatic   duct diameter indicating no functional obstruction.  The dorsal pancreatic duct   does appear to consistent with a pancreatic divisum anatomy, terminating   expected location of the minor papilla.         Liver, spleen, adrenal glands, and kidneys are unremarkable.  No   hydronephrosis. Included small and large bowel loops are non-dilated.         No large signal abnormality in the visualized lungs. No suspicious bone marrow   or muscle signal abnormality.  Lumbar compression fractures and vertebral   augmentation changes.     IMPRESSION:   1. Main pancreatic duct appears to terminate at the minor papilla, consistent   with a pancreatic divisum anatomy.  No functional obstruction on secretin   imaging.   2. Cholelithiasis.\"    EUS " "11/23/2022:  \"Findings:        ENDOSCOPIC FINDING: :        The Z-line was irregular and was found 36 cm from the incisors.        A few dispersed less than 5 mm erosions with no bleeding and no stigmata        of recent bleeding were found in the entire examined stomach. Biopsies        were taken with a cold forceps for Helicobacter pylori testing.        The first portion of the duodenum, second portion of the duodenum, third        portion of the duodenum and fourth portion of the duodenum were normal.        Fluid pooling in the duodenum as can be seen in dysmotilty.        ENDOSONOGRAPHIC FINDING: :        The regions of the celiac plexus and celiac ganglia were visualized and        were normal. No lymphadenopathy was seen.        Endosonographic imaging in the peritoneal cavity showed no ascites        Endosonographic imaging in the left lobe of the liver showed no        intrahepatic ductal dilation or mass.        The gallbladder was visualized        Endosonographic imaging in the common bile duct showed no stones, sludge        or dilation.        There was no sign of significant endosonographic abnormality in the        ampulla.        There was no sign of significant endosonographic abnormality in the        pancreatic head (HOP), pancreatic body (BOP), pancreatic tail (TOP), the        uncinate process of the pancreas or the pancreatic neck (NOP). The        pancreatic duct measured up to 3 mm in diameter in the HOP, 2 mm in the        NOP, and 1 mm in the BOP with disappearance into the tail. The        pancreatic duct was regular in contour and was not hyperechoic. Sharp        demarcation was visualized between the dorsal and ventral pancreas,        which is a normal endosonographic feature.        Endosonographic imaging in the entire pancreas showed possible pancreas        divisum (partial vs complete) with dominant (more prominent) dorsal duct.        No obvious pancreas stranding. " "    Impression:            EGD                          - Erosive gastropathy                          - No gastric or duodenal ulcers                          EUS                          - Partial vs complete panreas divisum.                          - No CBD stones     Recommendation:        - Return patient to [facility] for ongoing care.                          - MRCP in 6 weeks (with secretin stimulation). If                          divisum is proved, suggest referral to the [facility] for consideration to be included in the                          SHARP study. i realize that we only have one possible                          (likely) documented attack of pancreatitis but she                          need to be evaluated for that study so that if future                          episodes occurred they are captured. The SHARP study                          is looking at the question whether recurrent acute                          pancreatitis in pancras divisum can be treated by                          minor papilla sphincterotomy. the EUS today is                          suggestive of pancreas divisum (partial vs complete).                          Whether this explains the patient chronic abdominal                          pain is unknown.                          - Avoid NSAIDs                          - Await path results. treat H pylori if found.                          - Start omeprazole 40 mg daily uninterrupted.                          - rest of care per primary team   Zabrina Haney MD \"              Chief Complaint   Patient presents with     New Patient       Vitals:    04/26/23 1109   BP: 133/76   BP Location: Left arm   Patient Position: Sitting   Cuff Size: Adult Regular   Pulse: 80   SpO2: 94%   Weight: 57.7 kg (127 lb 1.6 oz)   Height: 1.6 m (5' 3\")       Body mass index is 22.51 kg/m .    Rm Shanks        Again, thank you for allowing me to " participate in the care of your patient.        Sincerely,        Jermaine Dao MD

## 2023-04-26 NOTE — PROGRESS NOTES
"Chief Complaint   Patient presents with     New Patient       Vitals:    04/26/23 1109   BP: 133/76   BP Location: Left arm   Patient Position: Sitting   Cuff Size: Adult Regular   Pulse: 80   SpO2: 94%   Weight: 57.7 kg (127 lb 1.6 oz)   Height: 1.6 m (5' 3\")       Body mass index is 22.51 kg/m .    Rm Shanks" Cortrak Placement     Tube Team verified patient name and medical record number prior to tube placement.  Cortrak tube (43 inches, 10 [place] Islander) placed at 70 cm in R nare.  Per Cortrak picture, tube appears to be in the stomach.    Nursing Instructions: Awaiting KUB to confirm placement before use for medications or feeding. Once placement confirmed, flush tube with 30 ml of water, and then remove and save stylet, in patient medication drawer.

## 2023-05-11 NOTE — TELEPHONE ENCOUNTER
Called to follow up with patient regarding SHARP study.   Per patient, she's having trouble with her leg, she doesn't want follow up with study now. She will keep her scheduled RTC visit with Dr. Dao in July to follow up.    Gaby Mcgrath RN Care Coordinator

## 2023-07-20 ENCOUNTER — DOCUMENTATION ONLY (OUTPATIENT)
Dept: GASTROENTEROLOGY | Facility: CLINIC | Age: 88
End: 2023-07-20
Payer: COMMERCIAL

## 2023-07-20 NOTE — PROGRESS NOTES
"Called PT and left VM.    Called to remind patient of their upcoming appointment with our GI clinic, on 23 at 1:30 PM with Dr. Jermaine Dao. This appointment is scheduled as an in-person appt. Please arrive 15 minutes early to check in for your appointment. , if your appointment is virtual (video or telephone) you need to be in [place] for the visit. To reschedule or cancel patient to call [phone].    [facility] Patient is  and appointment has been cancelled.    \"Formatting of this note might be different from the original.  I was made aware of Ms. Egan's passing.  I spoke to her daughter, Lucy Robertson, today and reviewed negative bronchoscopy findings.       Jayla's deterioration was abrupt.  It's highly unlikely ILD was primarily responsible.  Pulmonary embolization, cardiac disease, PTX, etc. are all considerations.  The patient deferred repeat hospitalization, opting for comfort care.          Electronically signed by Tirso Bernal MD at 2023  8:51 AM CDT\"      SK    "